# Patient Record
Sex: MALE | Race: WHITE | NOT HISPANIC OR LATINO
[De-identification: names, ages, dates, MRNs, and addresses within clinical notes are randomized per-mention and may not be internally consistent; named-entity substitution may affect disease eponyms.]

---

## 2017-07-25 ENCOUNTER — APPOINTMENT (OUTPATIENT)
Dept: UROLOGY | Facility: CLINIC | Age: 37
End: 2017-07-25

## 2017-07-27 ENCOUNTER — APPOINTMENT (OUTPATIENT)
Dept: UROLOGY | Facility: CLINIC | Age: 37
End: 2017-07-27

## 2019-06-12 ENCOUNTER — LABORATORY RESULT (OUTPATIENT)
Age: 39
End: 2019-06-12

## 2019-06-12 ENCOUNTER — APPOINTMENT (OUTPATIENT)
Dept: PULMONOLOGY | Facility: CLINIC | Age: 39
End: 2019-06-12
Payer: COMMERCIAL

## 2019-06-12 ENCOUNTER — NON-APPOINTMENT (OUTPATIENT)
Age: 39
End: 2019-06-12

## 2019-06-12 VITALS
OXYGEN SATURATION: 97 % | HEIGHT: 69 IN | DIASTOLIC BLOOD PRESSURE: 76 MMHG | TEMPERATURE: 98.6 F | WEIGHT: 172 LBS | HEART RATE: 60 BPM | RESPIRATION RATE: 14 BRPM | BODY MASS INDEX: 25.48 KG/M2 | SYSTOLIC BLOOD PRESSURE: 119 MMHG

## 2019-06-12 PROCEDURE — 36415 COLL VENOUS BLD VENIPUNCTURE: CPT

## 2019-06-12 PROCEDURE — 93000 ELECTROCARDIOGRAM COMPLETE: CPT

## 2019-06-12 PROCEDURE — 81003 URINALYSIS AUTO W/O SCOPE: CPT | Mod: QW

## 2019-06-12 PROCEDURE — 99396 PREV VISIT EST AGE 40-64: CPT | Mod: 25

## 2019-06-12 PROCEDURE — 83036 HEMOGLOBIN GLYCOSYLATED A1C: CPT | Mod: QW

## 2019-06-14 LAB
BILIRUB UR QL STRIP: NORMAL
CLARITY UR: CLEAR
COLLECTION METHOD: NORMAL
GLUCOSE UR-MCNC: NEGATIVE
HCG UR QL: 0.2 EU/DL
HGB UR QL STRIP.AUTO: NEGATIVE
KETONES UR-MCNC: NORMAL
LEUKOCYTE ESTERASE UR QL STRIP: NEGATIVE
NITRITE UR QL STRIP: NEGATIVE
PH UR STRIP: 5.5
PROT UR STRIP-MCNC: NEGATIVE
SP GR UR STRIP: 1.02

## 2019-06-14 NOTE — PHYSICAL EXAM
[No Acute Distress] : no acute distress [Well Nourished] : well nourished [Well Developed] : well developed [Well-Appearing] : well-appearing [Normal Sclera/Conjunctiva] : normal sclera/conjunctiva [EOMI] : extraocular movements intact [Normal Outer Ear/Nose] : the outer ears and nose were normal in appearance [PERRL] : pupils equal round and reactive to light [Normal Oropharynx] : the oropharynx was normal [No JVD] : no jugular venous distention [Supple] : supple [No Lymphadenopathy] : no lymphadenopathy [Thyroid Normal, No Nodules] : the thyroid was normal and there were no nodules present [Clear to Auscultation] : lungs were clear to auscultation bilaterally [No Respiratory Distress] : no respiratory distress  [No Accessory Muscle Use] : no accessory muscle use [Normal Rate] : normal rate  [Regular Rhythm] : with a regular rhythm [No Murmur] : no murmur heard [Normal S1, S2] : normal S1 and S2 [No Abdominal Bruit] : a ~M bruit was not heard ~T in the abdomen [No Carotid Bruits] : no carotid bruits [No Varicosities] : no varicosities [Pedal Pulses Present] : the pedal pulses are present [No Edema] : there was no peripheral edema [No Extremity Clubbing/Cyanosis] : no extremity clubbing/cyanosis [No Palpable Aorta] : no palpable aorta [Soft] : abdomen soft [Non Tender] : non-tender [Non-distended] : non-distended [No Masses] : no abdominal mass palpated [No HSM] : no HSM [Normal Bowel Sounds] : normal bowel sounds [Normal Posterior Cervical Nodes] : no posterior cervical lymphadenopathy [Normal Anterior Cervical Nodes] : no anterior cervical lymphadenopathy [No CVA Tenderness] : no CVA  tenderness [No Spinal Tenderness] : no spinal tenderness [No Joint Swelling] : no joint swelling [Grossly Normal Strength/Tone] : grossly normal strength/tone [Normal Gait] : normal gait [No Rash] : no rash [No Focal Deficits] : no focal deficits [Coordination Grossly Intact] : coordination grossly intact [Deep Tendon Reflexes (DTR)] : deep tendon reflexes were 2+ and symmetric [Normal Affect] : the affect was normal [Normal Insight/Judgement] : insight and judgment were intact

## 2019-06-14 NOTE — DATA REVIEWED
[FreeTextEntry1] : EKG performed in office today shows sinus bradycardia at 52 beats per minute, no acute ST changes.\par \par Urinalysis is negative

## 2019-06-18 LAB
25(OH)D3 SERPL-MCNC: 28.5 NG/ML
ALBUMIN SERPL ELPH-MCNC: 4.7 G/DL
ALBUMIN: 30
ALP BLD-CCNC: 67 U/L
ALT SERPL-CCNC: 19 U/L
ANION GAP SERPL CALC-SCNC: 14 MMOL/L
AST SERPL-CCNC: 17 U/L
BASOPHILS # BLD AUTO: 0.05 K/UL
BASOPHILS NFR BLD AUTO: 0.7 %
BILIRUB SERPL-MCNC: 1.1 MG/DL
BUN SERPL-MCNC: 15 MG/DL
CALCIUM SERPL-MCNC: 9.3 MG/DL
CHLORIDE SERPL-SCNC: 104 MMOL/L
CHOLEST SERPL-MCNC: 147 MG/DL
CHOLEST/HDLC SERPL: 3.1 RATIO
CO2 SERPL-SCNC: 24 MMOL/L
CREAT SERPL-MCNC: 0.78 MG/DL
CREATININE: <30
EOSINOPHIL # BLD AUTO: 0.1 K/UL
EOSINOPHIL NFR BLD AUTO: 1.3 %
GLUCOSE SERPL-MCNC: 91 MG/DL
HBA1C MFR BLD HPLC: 5
HCT VFR BLD CALC: 46.8 %
HCV AB SER QL: NONREACTIVE
HCV S/CO RATIO: 0.04 S/CO
HDLC SERPL-MCNC: 48 MG/DL
HGB BLD-MCNC: 15.2 G/DL
IMM GRANULOCYTES NFR BLD AUTO: 0.4 %
LDLC SERPL CALC-MCNC: 84 MG/DL
LYMPHOCYTES # BLD AUTO: 3.07 K/UL
LYMPHOCYTES NFR BLD AUTO: 40.4 %
MAGNESIUM SERPL-MCNC: 2.1 MG/DL
MAN DIFF?: NORMAL
MCHC RBC-ENTMCNC: 29.7 PG
MCHC RBC-ENTMCNC: 32.5 GM/DL
MCV RBC AUTO: 91.6 FL
MICROALBUMIN/CREAT UR TEST STR-RTO: 300
MONOCYTES # BLD AUTO: 0.4 K/UL
MONOCYTES NFR BLD AUTO: 5.3 %
NEUTROPHILS # BLD AUTO: 3.95 K/UL
NEUTROPHILS NFR BLD AUTO: 51.9 %
PHOSPHATE SERPL-MCNC: 4.4 MG/DL
PLATELET # BLD AUTO: 221 K/UL
POTASSIUM SERPL-SCNC: 4.1 MMOL/L
PROT SERPL-MCNC: 7 G/DL
PSA SERPL-MCNC: 0.71 NG/ML
RBC # BLD: 5.11 M/UL
RBC # FLD: 12.9 %
SODIUM SERPL-SCNC: 142 MMOL/L
T3 SERPL-MCNC: 105 NG/DL
T3RU NFR SERPL: 1 TBI
T4 FREE SERPL-MCNC: 1.3 NG/DL
T4 SERPL-MCNC: 5.4 UG/DL
TRIGL SERPL-MCNC: 75 MG/DL
TSH SERPL-ACNC: 2.36 UIU/ML
WBC # FLD AUTO: 7.6 K/UL

## 2019-08-28 ENCOUNTER — RX RENEWAL (OUTPATIENT)
Age: 39
End: 2019-08-28

## 2020-08-10 ENCOUNTER — LABORATORY RESULT (OUTPATIENT)
Age: 40
End: 2020-08-10

## 2020-08-10 ENCOUNTER — APPOINTMENT (OUTPATIENT)
Dept: PULMONOLOGY | Facility: CLINIC | Age: 40
End: 2020-08-10
Payer: COMMERCIAL

## 2020-08-10 ENCOUNTER — NON-APPOINTMENT (OUTPATIENT)
Age: 40
End: 2020-08-10

## 2020-08-10 VITALS
DIASTOLIC BLOOD PRESSURE: 78 MMHG | SYSTOLIC BLOOD PRESSURE: 113 MMHG | BODY MASS INDEX: 24.18 KG/M2 | HEART RATE: 62 BPM | HEIGHT: 69.5 IN | WEIGHT: 167.02 LBS | OXYGEN SATURATION: 96 % | TEMPERATURE: 97.6 F

## 2020-08-10 PROCEDURE — 99396 PREV VISIT EST AGE 40-64: CPT | Mod: 25

## 2020-08-10 PROCEDURE — 36415 COLL VENOUS BLD VENIPUNCTURE: CPT

## 2020-08-10 PROCEDURE — 82044 UR ALBUMIN SEMIQUANTITATIVE: CPT | Mod: QW

## 2020-08-10 PROCEDURE — 93000 ELECTROCARDIOGRAM COMPLETE: CPT

## 2020-08-10 PROCEDURE — 81003 URINALYSIS AUTO W/O SCOPE: CPT | Mod: QW

## 2020-08-10 NOTE — PHYSICAL EXAM
[No Acute Distress] : no acute distress [Well Nourished] : well nourished [Well Developed] : well developed [Well-Appearing] : well-appearing [Normal Sclera/Conjunctiva] : normal sclera/conjunctiva [PERRL] : pupils equal round and reactive to light [Normal Outer Ear/Nose] : the outer ears and nose were normal in appearance [EOMI] : extraocular movements intact [No Lymphadenopathy] : no lymphadenopathy [No JVD] : no jugular venous distention [Normal Oropharynx] : the oropharynx was normal [No Respiratory Distress] : no respiratory distress  [Thyroid Normal, No Nodules] : the thyroid was normal and there were no nodules present [Supple] : supple [No Accessory Muscle Use] : no accessory muscle use [Clear to Auscultation] : lungs were clear to auscultation bilaterally [Normal Rate] : normal rate  [Normal S1, S2] : normal S1 and S2 [Regular Rhythm] : with a regular rhythm [No Murmur] : no murmur heard [No Abdominal Bruit] : a ~M bruit was not heard ~T in the abdomen [No Varicosities] : no varicosities [No Carotid Bruits] : no carotid bruits [No Edema] : there was no peripheral edema [Pedal Pulses Present] : the pedal pulses are present [No Palpable Aorta] : no palpable aorta [Soft] : abdomen soft [No Extremity Clubbing/Cyanosis] : no extremity clubbing/cyanosis [No Masses] : no abdominal mass palpated [Non-distended] : non-distended [Non Tender] : non-tender [Normal Bowel Sounds] : normal bowel sounds [No HSM] : no HSM [Normal Anterior Cervical Nodes] : no anterior cervical lymphadenopathy [Normal Posterior Cervical Nodes] : no posterior cervical lymphadenopathy [No Spinal Tenderness] : no spinal tenderness [No Joint Swelling] : no joint swelling [No CVA Tenderness] : no CVA  tenderness [No Rash] : no rash [Grossly Normal Strength/Tone] : grossly normal strength/tone [No Focal Deficits] : no focal deficits [Coordination Grossly Intact] : coordination grossly intact [Normal Affect] : the affect was normal [Normal Gait] : normal gait [Normal Insight/Judgement] : insight and judgment were intact

## 2020-08-11 NOTE — DATA REVIEWED
[FreeTextEntry1] : EKG shows sinus bradycardia at 88 bpm, no acute ST changes.\par \par \par  POCT - A Urinalysis Dip (Automated)             Final\par \par No Documents Attached\par \par \par   Test   Result   Flag Reference Goal Last Verified \par   Glucose negative      REQUIRED \par   Bilirubin SMALL      REQUIRED \par   Ketone negative      REQUIRED \par   Specific Gravity >1.030      REQUIRED \par   Blood TRACE-INTACT      REQUIRED \par   pH 5.5      REQUIRED \par   Protein TRACE      REQUIRED \par   Urobilinogen 0.2 EU/dl      REQUIRED \par   Nitrite negative      REQUIRED \par   Leukocytes negative      REQUIRED \par   Clarity Clear      REQUIRED \par \par  Ordered by: KHALIF HERRERA       Collected/Examined: 10Aug2020 03:21PM       \par Verification Required       Stage: Final       \par  Performed at: In Office       Performed by: KHALIF HERRERA       Resulted: 10Aug2020 03:21PM       Last Updated: 10Aug2020 03:22PM       \par

## 2020-08-16 ENCOUNTER — TRANSCRIPTION ENCOUNTER (OUTPATIENT)
Age: 40
End: 2020-08-16

## 2020-08-16 LAB
25(OH)D3 SERPL-MCNC: 65.1 NG/ML
ALBUMIN SERPL ELPH-MCNC: 4.8 G/DL
ALBUMIN: 30
ALP BLD-CCNC: 65 U/L
ALT SERPL-CCNC: 15 U/L
ANION GAP SERPL CALC-SCNC: 13 MMOL/L
AST SERPL-CCNC: 16 U/L
BACTERIA UR CULT: NORMAL
BASOPHILS # BLD AUTO: 0.05 K/UL
BASOPHILS NFR BLD AUTO: 0.6 %
BILIRUB SERPL-MCNC: 1.6 MG/DL
BILIRUB UR QL STRIP: NORMAL
BUN SERPL-MCNC: 13 MG/DL
CALCIUM SERPL-MCNC: 9.5 MG/DL
CHLORIDE SERPL-SCNC: 102 MMOL/L
CHOLEST SERPL-MCNC: 162 MG/DL
CHOLEST/HDLC SERPL: 3.4 RATIO
CLARITY UR: CLEAR
CO2 SERPL-SCNC: 26 MMOL/L
CREAT SERPL-MCNC: 0.76 MG/DL
CREAT SPEC-SCNC: 393 MG/DL
CREATININE: 300
EOSINOPHIL # BLD AUTO: 0.09 K/UL
EOSINOPHIL NFR BLD AUTO: 1 %
ESTIMATED AVERAGE GLUCOSE: 103 MG/DL
GLUCOSE SERPL-MCNC: 79 MG/DL
GLUCOSE UR-MCNC: NEGATIVE
HBA1C MFR BLD HPLC: 5.2 %
HCG UR QL: 0.2 EU/DL
HCT VFR BLD CALC: 44.8 %
HCV AB SER QL: NONREACTIVE
HCV S/CO RATIO: 0.07 S/CO
HDLC SERPL-MCNC: 47 MG/DL
HGB BLD-MCNC: 14.5 G/DL
HGB UR QL STRIP.AUTO: NORMAL
IMM GRANULOCYTES NFR BLD AUTO: 0.2 %
KETONES UR-MCNC: NEGATIVE
LDLC SERPL CALC-MCNC: 98 MG/DL
LEUKOCYTE ESTERASE UR QL STRIP: NEGATIVE
LYMPHOCYTES # BLD AUTO: 3.23 K/UL
LYMPHOCYTES NFR BLD AUTO: 37.3 %
MAGNESIUM SERPL-MCNC: 2.2 MG/DL
MAN DIFF?: NORMAL
MCHC RBC-ENTMCNC: 29.2 PG
MCHC RBC-ENTMCNC: 32.4 GM/DL
MCV RBC AUTO: 90.1 FL
MICROALBUMIN 24H UR DL<=1MG/L-MCNC: 2.2 MG/DL
MICROALBUMIN/CREAT 24H UR-RTO: 6 MG/G
MICROALBUMIN/CREAT UR TEST STR-RTO: <30
MONOCYTES # BLD AUTO: 0.41 K/UL
MONOCYTES NFR BLD AUTO: 4.7 %
NEUTROPHILS # BLD AUTO: 4.86 K/UL
NEUTROPHILS NFR BLD AUTO: 56.2 %
NITRITE UR QL STRIP: NEGATIVE
PH UR STRIP: 5.5
PHOSPHATE SERPL-MCNC: 3.6 MG/DL
PLATELET # BLD AUTO: 237 K/UL
POTASSIUM SERPL-SCNC: 4.1 MMOL/L
PROT SERPL-MCNC: 7.1 G/DL
PROT UR STRIP-MCNC: NORMAL
PSA SERPL-MCNC: 0.97 NG/ML
RBC # BLD: 4.97 M/UL
RBC # FLD: 12.2 %
SODIUM SERPL-SCNC: 141 MMOL/L
SP GR UR STRIP: >1.03
T3 SERPL-MCNC: 99 NG/DL
T3RU NFR SERPL: 1 TBI
T4 FREE SERPL-MCNC: 1.2 NG/DL
T4 SERPL-MCNC: 5 UG/DL
TRIGL SERPL-MCNC: 82 MG/DL
TSH SERPL-ACNC: 1.68 UIU/ML
WBC # FLD AUTO: 8.66 K/UL

## 2021-09-08 ENCOUNTER — LABORATORY RESULT (OUTPATIENT)
Age: 41
End: 2021-09-08

## 2021-09-08 ENCOUNTER — NON-APPOINTMENT (OUTPATIENT)
Age: 41
End: 2021-09-08

## 2021-09-08 ENCOUNTER — APPOINTMENT (OUTPATIENT)
Dept: PULMONOLOGY | Facility: CLINIC | Age: 41
End: 2021-09-08
Payer: COMMERCIAL

## 2021-09-08 VITALS
SYSTOLIC BLOOD PRESSURE: 122 MMHG | TEMPERATURE: 97.5 F | BODY MASS INDEX: 23.55 KG/M2 | HEART RATE: 57 BPM | DIASTOLIC BLOOD PRESSURE: 86 MMHG | WEIGHT: 159 LBS | OXYGEN SATURATION: 98 % | HEIGHT: 69 IN

## 2021-09-08 DIAGNOSIS — R35.8 XXOTHER POLYURIA: ICD-10-CM

## 2021-09-08 PROCEDURE — 82044 UR ALBUMIN SEMIQUANTITATIVE: CPT | Mod: QW

## 2021-09-08 PROCEDURE — 81003 URINALYSIS AUTO W/O SCOPE: CPT | Mod: QW

## 2021-09-08 PROCEDURE — 36415 COLL VENOUS BLD VENIPUNCTURE: CPT

## 2021-09-08 PROCEDURE — 99396 PREV VISIT EST AGE 40-64: CPT | Mod: 25

## 2021-09-08 PROCEDURE — 83036 HEMOGLOBIN GLYCOSYLATED A1C: CPT | Mod: QW

## 2021-09-08 PROCEDURE — 93000 ELECTROCARDIOGRAM COMPLETE: CPT

## 2021-09-09 LAB
BILIRUB UR QL STRIP: NORMAL
CLARITY UR: CLEAR
COLLECTION METHOD: NORMAL
GLUCOSE UR-MCNC: NORMAL
HBA1C MFR BLD HPLC: 5
HCG UR QL: 0.2 EU/DL
HGB UR QL STRIP.AUTO: NORMAL
KETONES UR-MCNC: NORMAL
LEUKOCYTE ESTERASE UR QL STRIP: NORMAL
NITRITE UR QL STRIP: NORMAL
PH UR STRIP: 5.5
PROT UR STRIP-MCNC: NORMAL
SP GR UR STRIP: 1.02

## 2021-09-09 NOTE — DATA REVIEWED
[FreeTextEntry1] : EKG shows sinus rhythm at 57 bpm, no acute ST changes.\par \par \par  POCT - Hemoglobin A1C             Final\par \par No Documents Attached\par \par \par   Test   Result   Flag Reference Goal Last Verified \par   POCT Hemoglobin A1C 5.0   Normal: 4.0 - 5.6  REQUIRED \par \par  Ordered by: KHALIF HERRERA       Collected/Examined: 08Sep2021 06:41PM       \par Verification Required       Stage: Final       \par  Performed at: In Office       Performed by: KHALIF HERRERA       Resulted: 08Sep2021 06:41PM       Last Updated: 08Sep2021 06:42PM     \par \par  POCT - A Urinalysis Dip (Automated)             Final\par \par No Documents Attached\par \par \par   Test   Result   Flag Reference Goal Last Verified \par   Glucose NEG      REQUIRED \par   Bilirubin NEG      REQUIRED \par   Ketone NEG      REQUIRED \par   Specific Gravity 1.025      REQUIRED \par   Blood NEG      REQUIRED \par   pH 5.5      REQUIRED \par   Protein NEG      REQUIRED \par   Urobilinogen 0.2 EU/dl      REQUIRED \par   Nitrite NEG      REQUIRED \par   Leukocytes NEG      REQUIRED \par   Clarity Clear      REQUIRED \par   Collection Method Void      REQUIRED \par \par  Ordered by: KHALIF HERRERA       Collected/Examined: 08Sep2021 06:19PM       \par Verification Required       Stage: Final       \par  Performed at: In Office       Performed by: KHALIF HERRERA       Resulted: 08Sep2021 06:19PM       Last Updated: 08Sep2021 06:20PM       \par   \par

## 2021-09-12 LAB
25(OH)D3 SERPL-MCNC: 65 NG/ML
ALBUMIN SERPL ELPH-MCNC: 4.6 G/DL
ALBUMIN: 10
ALP BLD-CCNC: 54 U/L
ALT SERPL-CCNC: 13 U/L
ANION GAP SERPL CALC-SCNC: 14 MMOL/L
AST SERPL-CCNC: 15 U/L
BASOPHILS # BLD AUTO: 0.05 K/UL
BASOPHILS NFR BLD AUTO: 0.7 %
BILIRUB SERPL-MCNC: 1.5 MG/DL
BUN SERPL-MCNC: 12 MG/DL
CALCIUM SERPL-MCNC: 9.4 MG/DL
CHLORIDE SERPL-SCNC: 101 MMOL/L
CHOLEST SERPL-MCNC: 153 MG/DL
CO2 SERPL-SCNC: 24 MMOL/L
CREAT SERPL-MCNC: 0.8 MG/DL
CREATININE: 300
EOSINOPHIL # BLD AUTO: 0.09 K/UL
EOSINOPHIL NFR BLD AUTO: 1.2 %
GLUCOSE SERPL-MCNC: 74 MG/DL
HCT VFR BLD CALC: 45.6 %
HCV AB SER QL: NONREACTIVE
HCV S/CO RATIO: 0.11 S/CO
HDLC SERPL-MCNC: 54 MG/DL
HGB BLD-MCNC: 14.8 G/DL
IMM GRANULOCYTES NFR BLD AUTO: 0.3 %
LDLC SERPL CALC-MCNC: 89 MG/DL
LYMPHOCYTES # BLD AUTO: 3.05 K/UL
LYMPHOCYTES NFR BLD AUTO: 40.9 %
MAGNESIUM SERPL-MCNC: 2 MG/DL
MAN DIFF?: NORMAL
MCHC RBC-ENTMCNC: 30 PG
MCHC RBC-ENTMCNC: 32.5 GM/DL
MCV RBC AUTO: 92.3 FL
MICROALBUMIN/CREAT UR TEST STR-RTO: 30
MONOCYTES # BLD AUTO: 0.41 K/UL
MONOCYTES NFR BLD AUTO: 5.5 %
NEUTROPHILS # BLD AUTO: 3.84 K/UL
NEUTROPHILS NFR BLD AUTO: 51.4 %
NONHDLC SERPL-MCNC: 99 MG/DL
PHOSPHATE SERPL-MCNC: 4.1 MG/DL
PLATELET # BLD AUTO: 202 K/UL
POTASSIUM SERPL-SCNC: 3.9 MMOL/L
PROT SERPL-MCNC: 7.1 G/DL
PSA SERPL-MCNC: 0.59 NG/ML
RBC # BLD: 4.94 M/UL
RBC # FLD: 12.5 %
SODIUM SERPL-SCNC: 140 MMOL/L
T3 SERPL-MCNC: 101 NG/DL
T3RU NFR SERPL: 0.9 TBI
T4 FREE SERPL-MCNC: 1.5 NG/DL
T4 SERPL-MCNC: 5.6 UG/DL
TRIGL SERPL-MCNC: 49 MG/DL
TSH SERPL-ACNC: 2.8 UIU/ML
WBC # FLD AUTO: 7.46 K/UL

## 2021-12-01 ENCOUNTER — APPOINTMENT (OUTPATIENT)
Dept: PULMONOLOGY | Facility: CLINIC | Age: 41
End: 2021-12-01
Payer: COMMERCIAL

## 2021-12-01 VITALS — DIASTOLIC BLOOD PRESSURE: 82 MMHG | OXYGEN SATURATION: 99 % | HEART RATE: 62 BPM | SYSTOLIC BLOOD PRESSURE: 129 MMHG

## 2021-12-01 DIAGNOSIS — Z82.3 FAMILY HISTORY OF STROKE: ICD-10-CM

## 2021-12-01 DIAGNOSIS — Z83.3 FAMILY HISTORY OF DIABETES MELLITUS: ICD-10-CM

## 2021-12-01 PROCEDURE — 99214 OFFICE O/P EST MOD 30 MIN: CPT | Mod: 25

## 2021-12-01 PROCEDURE — 36415 COLL VENOUS BLD VENIPUNCTURE: CPT

## 2021-12-02 LAB
ANION GAP SERPL CALC-SCNC: 16 MMOL/L
BUN SERPL-MCNC: 11 MG/DL
CALCIUM SERPL-MCNC: 9.7 MG/DL
CHLORIDE SERPL-SCNC: 103 MMOL/L
CO2 SERPL-SCNC: 24 MMOL/L
CREAT SERPL-MCNC: 0.73 MG/DL
GLUCOSE SERPL-MCNC: 83 MG/DL
POTASSIUM SERPL-SCNC: 4.6 MMOL/L
SODIUM SERPL-SCNC: 143 MMOL/L

## 2021-12-02 NOTE — PLAN
[FreeTextEntry1] : Venipuncture with labs drawn in office\par Get abdominal/pelvic CT scans for further evaluation.\par Continue home blood pressure log book.

## 2021-12-02 NOTE — REVIEW OF SYSTEMS
[Negative] : Heme/Lymph [Abdominal Pain] : abdominal pain [FreeTextEntry7] : Intermittent left lower quadrant abdominal pain

## 2021-12-02 NOTE — ASSESSMENT
[FreeTextEntry1] : Intermittent left lower quadrant abdominal pain, rule out diverticulitis, rule out ulcerative colitis.

## 2021-12-02 NOTE — HISTORY OF PRESENT ILLNESS
[FreeTextEntry1] : BP f/u.  Also complains of intermittent left lower quadrant abdominal pain, no nausea, vomiting or diarrhea. [de-identified] : Patient has been monitoring BP at home \par \par Overall feeling well\par \par Occasional feels pinching/pulling sensation in the left lower abdomen over the past 6 months

## 2021-12-22 ENCOUNTER — APPOINTMENT (OUTPATIENT)
Dept: PULMONOLOGY | Facility: CLINIC | Age: 41
End: 2021-12-22

## 2022-01-03 ENCOUNTER — APPOINTMENT (OUTPATIENT)
Dept: PULMONOLOGY | Facility: CLINIC | Age: 42
End: 2022-01-03
Payer: COMMERCIAL

## 2022-01-03 DIAGNOSIS — E74.39 OTHER DISORDERS OF INTESTINAL CARBOHYDRATE ABSORPTION: ICD-10-CM

## 2022-01-03 PROCEDURE — 99215 OFFICE O/P EST HI 40 MIN: CPT | Mod: 95

## 2022-01-04 PROBLEM — E74.39 GLUCOSE INTOLERANCE: Status: ACTIVE | Noted: 2021-09-08

## 2022-01-04 NOTE — HISTORY OF PRESENT ILLNESS
[TextBox_4] : Angelito was diagnosed with COVID-19 infection on 12/18/21, he had sore throat/headache/fever then, his symptoms have gradually resolved.  He is currently ready to return to work.\par \par \par This visit was provided via telehealth using real-time 2-way audio visual technology.  The patient, ZENA BELTRÁN, was located at [facility], [address] [place] [address]\[place] [address] at the time of the visit.  \par The provider, Rosy Garcia, was located at the office [address] [facility], [address], [place], [place] at the time of the visit. \par The patient, Mr. ZENA BELTRÁN  and physician Rosy Garcia DO, participated in the telehealth encounter.\par \par Verbal consent was obtained by the  from patient.\par

## 2022-01-04 NOTE — ASSESSMENT
[FreeTextEntry1] : Advised him on well hydration and rest.\par Medications reviewed. Continue present medications.\par Will complete sick note for Angelito.\par \par Time spent in telehealth consultation and charting is 40 minutes.

## 2022-01-17 ENCOUNTER — OUTPATIENT (OUTPATIENT)
Dept: OUTPATIENT SERVICES | Facility: HOSPITAL | Age: 42
LOS: 1 days | End: 2022-01-17
Payer: COMMERCIAL

## 2022-01-17 ENCOUNTER — APPOINTMENT (OUTPATIENT)
Dept: CT IMAGING | Facility: IMAGING CENTER | Age: 42
End: 2022-01-17
Payer: COMMERCIAL

## 2022-01-17 DIAGNOSIS — R10.32 LEFT LOWER QUADRANT PAIN: ICD-10-CM

## 2022-01-17 PROCEDURE — 74178 CT ABD&PLV WO CNTR FLWD CNTR: CPT | Mod: 26

## 2022-01-17 PROCEDURE — 74178 CT ABD&PLV WO CNTR FLWD CNTR: CPT

## 2022-03-28 ENCOUNTER — APPOINTMENT (OUTPATIENT)
Dept: PULMONOLOGY | Facility: CLINIC | Age: 42
End: 2022-03-28
Payer: COMMERCIAL

## 2022-03-28 PROCEDURE — 99213 OFFICE O/P EST LOW 20 MIN: CPT | Mod: 95

## 2022-03-28 NOTE — PLAN
[FreeTextEntry1] : Will get liver MRI for further evaluation.\par \par \par Time spent in telehealth consultation and charting is 20 minutes.

## 2022-03-28 NOTE — HISTORY OF PRESENT ILLNESS
[FreeTextEntry1] : Angelito's left lower quadrant abdominal pain has completely resolved.  He currently offers no complaints. [de-identified] : This visit was provided via telehealth using real-time 2-way audio visual technology.  The patient, ZENA BELTRÁN, was located at home, [address] [place] [address]\[place] [address] at the time of the visit.  \par The provider, Rosy Garcia, was located at the office [address] [facility], [address], [place], [place] at the time of the visit. \par The patient, Mr. ZENA BELTRÁN  and physician Rsoy Garcia DO, participated in the telehealth encounter.\par \par Verbal consent was obtained by the  from patient.\par

## 2022-03-28 NOTE — DATA REVIEWED
[FreeTextEntry1] : \par   CT Abdomen and Pelvis w/wo IV Cont             Final\par \par No Documents Attached\par \par \par \par \par   EXAM:  CT ABDOMEN AND PELVIS WAW IC\par \par \par PROCEDURE DATE:  01/17/2022\par \par \par \par INTERPRETATION:  CLINICAL INFORMATION: Left lower quadrant pain\par \par ADDITIONAL CLINICAL INFORMATION: Other, Non-specified\par \par COMPARISON: None.\par \par CONTRAST/COMPLICATIONS:\par IV Contrast: Omnipaque 350  90 cc administered   10 cc discarded\par Oral Contrast: Omnipaque 300\par Complications: None reported at time of study completion\par \par PROCEDURE:\par CT of the Abdomen and Pelvis was performed.\par Sagittal and coronal reformats were performed.\par \par FINDINGS:\par LOWER CHEST: Within normal limits.\par \par LIVER: 18 mm indeterminate hypodensity left hepatic lobe, possible hemangioma. Suggest follow-up MRI of the liver. Additional subcentimeter hypodensity in the right hepatic lobe.\par BILE DUCTS: Normal caliber.\par GALLBLADDER: Within normal limits.\par SPLEEN: Fluid collection, 2.7 x 2.5 x 1 cm at medial aspect of the inferior spleen lateral to the left kidney and posterior to the pancreatic tail. No associated inflammatory change, possible lymphangioma or subcapsular seroma or cyst.\par Within normal limits.\par PANCREAS: Within normal limits.\par ADRENALS: Within normal limits.\par KIDNEYS/URETERS: Within normal limits.\par \par BLADDER: Within normal limits.\par REPRODUCTIVE ORGANS: Normal size prostate\par \par BOWEL: No bowel obstruction. Appendix normal. No inflammatory changes of the bowel.\par PERITONEUM: No ascites.\par VESSELS: Within normal limits.\par RETROPERITONEUM/LYMPH NODES: No lymphadenopathy. ABDOMINAL WALL: Within normal limits.\par BONES: Within normal limits.\par \par IMPRESSION:\par No acute intra-abdominal process.\par Indeterminant liver lesion, possible hemangioma, suggest further evaluation with MRI.\par Perisplenic cyst or collection, probably possible lymphangioma or subcapsular cyst or seroma.\par \par \par \par --- End of Report ---\par \par \par \par \par \par \par MARY FERMIN MD; Attending Radiologist\par This document has been electronically signed. Jan 18 2022  9:10AM\par \par  \par \par  Ordered by: KHALIF HERRERA       Collected/Examined: 17Jan2022 07:51PM       \par Verified by: KHALIF HERRERA 21Jan2022 05:03PM       \par  Result Communication: No patient communication needed at this time;\par Stage: Final       \par  Performed at: [facility] the [facility]       Resulted: 17Jan2022 04:09PM       Last Updated: 21Jan2022 05:03PM       Accession: [id number]

## 2022-05-09 ENCOUNTER — OUTPATIENT (OUTPATIENT)
Dept: OUTPATIENT SERVICES | Facility: HOSPITAL | Age: 42
LOS: 1 days | End: 2022-05-09
Payer: COMMERCIAL

## 2022-05-09 ENCOUNTER — APPOINTMENT (OUTPATIENT)
Dept: MRI IMAGING | Facility: IMAGING CENTER | Age: 42
End: 2022-05-09
Payer: COMMERCIAL

## 2022-05-09 DIAGNOSIS — K76.9 LIVER DISEASE, UNSPECIFIED: ICD-10-CM

## 2022-05-09 PROCEDURE — A9585: CPT

## 2022-05-09 PROCEDURE — 74183 MRI ABD W/O CNTR FLWD CNTR: CPT

## 2022-05-09 PROCEDURE — 74183 MRI ABD W/O CNTR FLWD CNTR: CPT | Mod: 26

## 2022-06-01 ENCOUNTER — APPOINTMENT (OUTPATIENT)
Dept: PULMONOLOGY | Facility: CLINIC | Age: 42
End: 2022-06-01
Payer: COMMERCIAL

## 2022-06-01 VITALS
TEMPERATURE: 98.5 F | DIASTOLIC BLOOD PRESSURE: 84 MMHG | HEART RATE: 57 BPM | SYSTOLIC BLOOD PRESSURE: 118 MMHG | WEIGHT: 157 LBS | BODY MASS INDEX: 23.25 KG/M2 | HEIGHT: 69 IN | OXYGEN SATURATION: 98 %

## 2022-06-01 DIAGNOSIS — K76.9 LIVER DISEASE, UNSPECIFIED: ICD-10-CM

## 2022-06-01 PROCEDURE — 99213 OFFICE O/P EST LOW 20 MIN: CPT

## 2022-06-04 PROBLEM — K76.9 LIVER LESION: Status: ACTIVE | Noted: 2022-03-28

## 2022-06-04 NOTE — DATA REVIEWED
[FreeTextEntry1] : \par  MR Abdomen w/wo IV Cont             Final\par \par No Documents Attached\par \par \par \par \par   EXAM: [id number] - MR ABDOMEN WAW IC  - ORDERED BY: KHALIF HERRERA\par \par \par PROCEDURE DATE:  05/09/2022\par \par \par \par INTERPRETATION:  CLINICAL INFORMATION: Liver lesion on abdominal CT.\par \par COMPARISON: CT abdomen and pelvis 1/17/2022\par \par CONTRAST/COMPLICATIONS:\par IV Contrast: Gadavist  7 cc administered   3 cc discarded\par Oral Contrast: NONE\par Complications: None reported at time of study completion\par \par PROCEDURE:\par MRI of the abdomen was performed.\par MRCP was performed.\par \par FINDINGS: Motion artifacts degrade some of the imaging.\par LOWER CHEST: Within normal limits.\par \par LIVER: Left lobe segment 2 subcapsular 2.4 x 2 cm T1 dark T2 bright lesion with characteristic enhancement compatible with a hemangioma. Two additional subcentimeter T2 bright lesions within segment 8 could also represent hemangiomas however are inconsistently seen\par BILE DUCTS: Normal caliber.\par GALLBLADDER: Decompressed. Filled with T1 bright sludge.\par SPLEEN: Unilocular approximately 3.5 x 2.6 x 3 cm T1 dark T2 bright lesion along the lower aspect of splenic hilus with T2 dark hemosiderin rim inferiorly and dependent T2 dark shading suggestive for a posttraumatic pseudocyst. Spleen measures 12 cm maximally.\par PANCREAS: Pancreatic tail abuts the splenic hilar cyst\par ADRENALS: Within normal limits.\par KIDNEYS/URETERS: Symmetric renal enhancement without hydronephrosis. Small left central sinus cysts and tiny left cortical cysts\par \par VISUALIZED PORTIONS:\par BOWEL: Obstructed bowel. 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 May 11 2022  5:27PM\par \par  \par \par  Ordered by: KHALIF HERRERA       Collected/Examined: [id number] 06:18PM       \par Verified by: KHALIF HERRERA 15May2022 04:00PM       \par  Result Communication: No patient communication needed at this time;\par Stage: Final       \par  Performed at: [facility] at the [facility]       Resulted: 11May2022 04:48PM       Last Updated: 15May2022 04:00PM       Accession: [id number]

## 2022-06-04 NOTE — PLAN
[FreeTextEntry1] : Discussed with Angelito at length regarding aforementioned abdominal MRI report, and all findings are secondary to benignity\par Return for medical follow-up in 3 months.

## 2022-06-04 NOTE — HISTORY OF PRESENT ILLNESS
[FreeTextEntry1] : Follow-up for left lower quadrant abdominal pain [de-identified] : Patient seen in office and denies pain on left lower quadrant of abdomen.\par Patient states he wants to discuss results of MRI of the abdomen.\par

## 2022-06-04 NOTE — PHYSICAL EXAM
[No Acute Distress] : no acute distress [Well Nourished] : well nourished [Well Developed] : well developed [Well-Appearing] : well-appearing [Normal Sclera/Conjunctiva] : normal sclera/conjunctiva [PERRL] : pupils equal round and reactive to light [EOMI] : extraocular movements intact [Normal Outer Ear/Nose] : the outer ears and nose were normal in appearance [Normal Oropharynx] : the oropharynx was normal [No JVD] : no jugular venous distention [No Lymphadenopathy] : no lymphadenopathy [Supple] : supple [Thyroid Normal, No Nodules] : the thyroid was normal and there were no nodules present [No Respiratory Distress] : no respiratory distress  [No Accessory Muscle Use] : no accessory muscle use [Clear to Auscultation] : lungs were clear to auscultation bilaterally [Normal Rate] : normal rate  [Regular Rhythm] : with a regular rhythm [Normal S1, S2] : normal S1 and S2 [No Murmur] : no murmur heard [No Carotid Bruits] : no carotid bruits [No Abdominal Bruit] : a ~M bruit was not heard ~T in the abdomen [No Varicosities] : no varicosities [Pedal Pulses Present] : the pedal pulses are present [No Edema] : there was no peripheral edema [No Extremity Clubbing/Cyanosis] : no extremity clubbing/cyanosis [No Palpable Aorta] : no palpable aorta [Soft] : abdomen soft [Non Tender] : non-tender [Non-distended] : non-distended [No Masses] : no abdominal mass palpated [No HSM] : no HSM [Normal Bowel Sounds] : normal bowel sounds [Normal Posterior Cervical Nodes] : no posterior cervical lymphadenopathy [Normal Anterior Cervical Nodes] : no anterior cervical lymphadenopathy [No CVA Tenderness] : no CVA  tenderness [No Spinal Tenderness] : no spinal tenderness [No Joint Swelling] : no joint swelling [Grossly Normal Strength/Tone] : grossly normal strength/tone [No Rash] : no rash [Coordination Grossly Intact] : coordination grossly intact [No Focal Deficits] : no focal deficits [Normal Gait] : normal gait [Deep Tendon Reflexes (DTR)] : deep tendon reflexes were 2+ and symmetric [Normal Affect] : the affect was normal [Normal Insight/Judgement] : insight and judgment were intact [de-identified] : Appears comfortable

## 2022-09-13 ENCOUNTER — RESULT CHARGE (OUTPATIENT)
Age: 42
End: 2022-09-13

## 2022-09-14 ENCOUNTER — LABORATORY RESULT (OUTPATIENT)
Age: 42
End: 2022-09-14

## 2022-09-14 ENCOUNTER — APPOINTMENT (OUTPATIENT)
Dept: PULMONOLOGY | Facility: CLINIC | Age: 42
End: 2022-09-14

## 2022-09-14 ENCOUNTER — NON-APPOINTMENT (OUTPATIENT)
Age: 42
End: 2022-09-14

## 2022-09-14 VITALS
OXYGEN SATURATION: 97 % | DIASTOLIC BLOOD PRESSURE: 78 MMHG | HEART RATE: 64 BPM | TEMPERATURE: 97.5 F | SYSTOLIC BLOOD PRESSURE: 118 MMHG

## 2022-09-14 DIAGNOSIS — U07.1 COVID-19: ICD-10-CM

## 2022-09-14 LAB
ALBUMIN: NORMAL
BILIRUB UR QL STRIP: NORMAL
CLARITY UR: CLEAR
COLLECTION METHOD: NORMAL
CREATININE: NORMAL
GLUCOSE UR-MCNC: NORMAL
HCG UR QL: 0.2 EU/DL
HGB UR QL STRIP.AUTO: NORMAL
KETONES UR-MCNC: NORMAL
LEUKOCYTE ESTERASE UR QL STRIP: NORMAL
MICROALBUMIN/CREAT UR TEST STR-RTO: NORMAL
NITRITE UR QL STRIP: NORMAL
PH UR STRIP: 5.5
PROT UR STRIP-MCNC: NORMAL
SP GR UR STRIP: >+1.03

## 2022-09-14 PROCEDURE — 81003 URINALYSIS AUTO W/O SCOPE: CPT | Mod: QW

## 2022-09-14 PROCEDURE — 36415 COLL VENOUS BLD VENIPUNCTURE: CPT

## 2022-09-14 PROCEDURE — 82044 UR ALBUMIN SEMIQUANTITATIVE: CPT | Mod: QW

## 2022-09-14 PROCEDURE — 93000 ELECTROCARDIOGRAM COMPLETE: CPT

## 2022-09-14 PROCEDURE — 99396 PREV VISIT EST AGE 40-64: CPT | Mod: 25

## 2022-09-14 RX ORDER — CHROMIUM 200 MCG
TABLET ORAL
Refills: 0 | Status: ACTIVE | COMMUNITY

## 2022-09-14 RX ORDER — ERGOCALCIFEROL 1.25 MG/1
1.25 MG CAPSULE, LIQUID FILLED ORAL
Qty: 12 | Refills: 3 | Status: COMPLETED | COMMUNITY
Start: 2019-06-18 | End: 2022-09-14

## 2022-09-15 LAB
25(OH)D3 SERPL-MCNC: 69.1 NG/ML
ALBUMIN SERPL ELPH-MCNC: 4.9 G/DL
ALP BLD-CCNC: 63 U/L
ALT SERPL-CCNC: 19 U/L
ANION GAP SERPL CALC-SCNC: 12 MMOL/L
AST SERPL-CCNC: 16 U/L
BASOPHILS # BLD AUTO: 0.05 K/UL
BASOPHILS NFR BLD AUTO: 0.5 %
BILIRUB DIRECT SERPL-MCNC: 0.4 MG/DL
BILIRUB INDIRECT SERPL-MCNC: 1.6 MG/DL
BILIRUB SERPL-MCNC: 2 MG/DL
BUN SERPL-MCNC: 16 MG/DL
CALCIUM SERPL-MCNC: 9.6 MG/DL
CHLORIDE SERPL-SCNC: 102 MMOL/L
CHOLEST SERPL-MCNC: 147 MG/DL
CO2 SERPL-SCNC: 26 MMOL/L
CREAT SERPL-MCNC: 0.84 MG/DL
EGFR: 111 ML/MIN/1.73M2
EOSINOPHIL # BLD AUTO: 0.06 K/UL
EOSINOPHIL NFR BLD AUTO: 0.6 %
ESTIMATED AVERAGE GLUCOSE: 108 MG/DL
GLUCOSE SERPL-MCNC: 76 MG/DL
HBA1C MFR BLD HPLC: 5.4 %
HCT VFR BLD CALC: 44.1 %
HDLC SERPL-MCNC: 55 MG/DL
HGB BLD-MCNC: 14.5 G/DL
IMM GRANULOCYTES NFR BLD AUTO: 0.3 %
LDLC SERPL CALC-MCNC: 83 MG/DL
LYMPHOCYTES # BLD AUTO: 3.04 K/UL
LYMPHOCYTES NFR BLD AUTO: 31.2 %
MAN DIFF?: NORMAL
MCHC RBC-ENTMCNC: 29.3 PG
MCHC RBC-ENTMCNC: 32.9 GM/DL
MCV RBC AUTO: 89.1 FL
MONOCYTES # BLD AUTO: 0.49 K/UL
MONOCYTES NFR BLD AUTO: 5 %
NEUTROPHILS # BLD AUTO: 6.06 K/UL
NEUTROPHILS NFR BLD AUTO: 62.4 %
NONHDLC SERPL-MCNC: 91 MG/DL
PLATELET # BLD AUTO: 218 K/UL
POTASSIUM SERPL-SCNC: 4.6 MMOL/L
PROT SERPL-MCNC: 6.6 G/DL
PSA SERPL-MCNC: 0.95 NG/ML
RBC # BLD: 4.95 M/UL
RBC # FLD: 12.7 %
SODIUM SERPL-SCNC: 140 MMOL/L
T3 SERPL-MCNC: 102 NG/DL
T3RU NFR SERPL: 0.9 TBI
T4 FREE SERPL-MCNC: 1.4 NG/DL
T4 SERPL-MCNC: 6.2 UG/DL
TRIGL SERPL-MCNC: 43 MG/DL
TSH SERPL-ACNC: 2.36 UIU/ML
WBC # FLD AUTO: 9.73 K/UL

## 2022-09-15 NOTE — HISTORY OF PRESENT ILLNESS
[FreeTextEntry1] : Angelito is a pleasant 42-year-old gentleman without significant past medical history, he came in for annual physical examination today, offers no complaints.

## 2022-10-19 ENCOUNTER — MED ADMIN CHARGE (OUTPATIENT)
Age: 42
End: 2022-10-19

## 2022-10-19 ENCOUNTER — APPOINTMENT (OUTPATIENT)
Dept: PULMONOLOGY | Facility: CLINIC | Age: 42
End: 2022-10-19

## 2022-10-19 VITALS — HEART RATE: 61 BPM | SYSTOLIC BLOOD PRESSURE: 122 MMHG | OXYGEN SATURATION: 98 % | DIASTOLIC BLOOD PRESSURE: 79 MMHG

## 2022-10-19 DIAGNOSIS — Z23 ENCOUNTER FOR IMMUNIZATION: ICD-10-CM

## 2022-10-19 PROCEDURE — 99214 OFFICE O/P EST MOD 30 MIN: CPT | Mod: 25

## 2022-10-19 PROCEDURE — G0008: CPT

## 2022-10-19 PROCEDURE — 90686 IIV4 VACC NO PRSV 0.5 ML IM: CPT

## 2022-10-19 PROCEDURE — 36415 COLL VENOUS BLD VENIPUNCTURE: CPT

## 2022-10-19 NOTE — PHYSICAL EXAM
[No Acute Distress] : no acute distress [Well Nourished] : well nourished [Well Developed] : well developed [Well-Appearing] : well-appearing [Normal Sclera/Conjunctiva] : normal sclera/conjunctiva [PERRL] : pupils equal round and reactive to light [EOMI] : extraocular movements intact [Normal Outer Ear/Nose] : the outer ears and nose were normal in appearance [Normal Oropharynx] : the oropharynx was normal [No JVD] : no jugular venous distention [No Lymphadenopathy] : no lymphadenopathy [Supple] : supple [Thyroid Normal, No Nodules] : the thyroid was normal and there were no nodules present [No Respiratory Distress] : no respiratory distress  [No Accessory Muscle Use] : no accessory muscle use [Clear to Auscultation] : lungs were clear to auscultation bilaterally [Normal Rate] : normal rate  [Regular Rhythm] : with a regular rhythm [No Murmur] : no murmur heard [Normal S1, S2] : normal S1 and S2 [No Carotid Bruits] : no carotid bruits [No Abdominal Bruit] : a ~M bruit was not heard ~T in the abdomen [No Varicosities] : no varicosities [Pedal Pulses Present] : the pedal pulses are present [No Edema] : there was no peripheral edema [No Palpable Aorta] : no palpable aorta [No Extremity Clubbing/Cyanosis] : no extremity clubbing/cyanosis [Soft] : abdomen soft [Non Tender] : non-tender [Non-distended] : non-distended [No Masses] : no abdominal mass palpated [No HSM] : no HSM [Normal Bowel Sounds] : normal bowel sounds [Normal Posterior Cervical Nodes] : no posterior cervical lymphadenopathy [Normal Anterior Cervical Nodes] : no anterior cervical lymphadenopathy [No CVA Tenderness] : no CVA  tenderness [No Spinal Tenderness] : no spinal tenderness [No Joint Swelling] : no joint swelling [Grossly Normal Strength/Tone] : grossly normal strength/tone [No Rash] : no rash [No Focal Deficits] : no focal deficits [Coordination Grossly Intact] : coordination grossly intact [Normal Gait] : normal gait [Deep Tendon Reflexes (DTR)] : deep tendon reflexes were 2+ and symmetric [Normal Affect] : the affect was normal [Normal Insight/Judgement] : insight and judgment were intact

## 2022-10-22 NOTE — PLAN
[FreeTextEntry1] : Venipuncture with labs drawn in office\par Patient refused abdominal CT scan for further evaluation.\par Fluzone vaccine given today.

## 2022-10-22 NOTE — HISTORY OF PRESENT ILLNESS
[FreeTextEntry1] : Transient abdominal pain [de-identified] : Has had "pinching" sensation in left lower quadrant, 1-3 days, for about 2-3 weeks, starting in September; has not felt it in the past 2 weeks, completely asymptomatic in the last 2 weeks.\par \par Requesting flu shot

## 2022-10-23 LAB
ALBUMIN SERPL ELPH-MCNC: 4.5 G/DL
ALP BLD-CCNC: 66 U/L
ALT SERPL-CCNC: 18 U/L
ANION GAP SERPL CALC-SCNC: 15 MMOL/L
AST SERPL-CCNC: 24 U/L
BASOPHILS # BLD AUTO: 0.06 K/UL
BASOPHILS NFR BLD AUTO: 0.9 %
BILIRUB SERPL-MCNC: 1.5 MG/DL
BUN SERPL-MCNC: 18 MG/DL
CALCIUM SERPL-MCNC: 9.2 MG/DL
CHLORIDE SERPL-SCNC: 100 MMOL/L
CO2 SERPL-SCNC: 22 MMOL/L
CREAT SERPL-MCNC: 0.75 MG/DL
EGFR: 115 ML/MIN/1.73M2
EOSINOPHIL # BLD AUTO: 0.13 K/UL
EOSINOPHIL NFR BLD AUTO: 1.9 %
ERYTHROCYTE [SEDIMENTATION RATE] IN BLOOD BY WESTERGREN METHOD: 5 MM/HR
GLUCOSE SERPL-MCNC: 78 MG/DL
HCT VFR BLD CALC: 44.8 %
HGB BLD-MCNC: 15.2 G/DL
IMM GRANULOCYTES NFR BLD AUTO: 0.1 %
LYMPHOCYTES # BLD AUTO: 2.87 K/UL
LYMPHOCYTES NFR BLD AUTO: 42.7 %
MAN DIFF?: NORMAL
MCHC RBC-ENTMCNC: 30.3 PG
MCHC RBC-ENTMCNC: 33.9 GM/DL
MCV RBC AUTO: 89.2 FL
MONOCYTES # BLD AUTO: 0.39 K/UL
MONOCYTES NFR BLD AUTO: 5.8 %
NEUTROPHILS # BLD AUTO: 3.26 K/UL
NEUTROPHILS NFR BLD AUTO: 48.6 %
PLATELET # BLD AUTO: 233 K/UL
POTASSIUM SERPL-SCNC: 4.4 MMOL/L
PROT SERPL-MCNC: 6.9 G/DL
RBC # BLD: 5.02 M/UL
RBC # FLD: 12.5 %
SODIUM SERPL-SCNC: 137 MMOL/L
WBC # FLD AUTO: 6.72 K/UL

## 2022-10-24 ENCOUNTER — TRANSCRIPTION ENCOUNTER (OUTPATIENT)
Age: 42
End: 2022-10-24

## 2023-02-08 ENCOUNTER — APPOINTMENT (OUTPATIENT)
Dept: PULMONOLOGY | Facility: CLINIC | Age: 43
End: 2023-02-08
Payer: COMMERCIAL

## 2023-02-08 VITALS — SYSTOLIC BLOOD PRESSURE: 109 MMHG | OXYGEN SATURATION: 100 % | DIASTOLIC BLOOD PRESSURE: 68 MMHG | HEART RATE: 65 BPM

## 2023-02-08 DIAGNOSIS — D73.4 CYST OF SPLEEN: ICD-10-CM

## 2023-02-08 DIAGNOSIS — R10.32 LEFT LOWER QUADRANT PAIN: ICD-10-CM

## 2023-02-08 PROCEDURE — 99213 OFFICE O/P EST LOW 20 MIN: CPT | Mod: 25

## 2023-02-08 PROCEDURE — 36415 COLL VENOUS BLD VENIPUNCTURE: CPT

## 2023-02-08 NOTE — HISTORY OF PRESENT ILLNESS
[FreeTextEntry1] : F/u  [de-identified] : Overall feeling well; has not felt "pinching" pain in abdomen as often, however, has felt some pain in the past few weeks; may be stress related

## 2023-02-08 NOTE — PLAN
[FreeTextEntry1] : Venipuncture with labs drawn in office\par Return for annual physical examination in 7 months.

## 2023-02-12 LAB
ALBUMIN SERPL ELPH-MCNC: 4.5 G/DL
ALP BLD-CCNC: 59 U/L
ALT SERPL-CCNC: 19 U/L
ANION GAP SERPL CALC-SCNC: 13 MMOL/L
AST SERPL-CCNC: 18 U/L
BASOPHILS # BLD AUTO: 0.04 K/UL
BASOPHILS NFR BLD AUTO: 0.5 %
BILIRUB SERPL-MCNC: 1.4 MG/DL
BUN SERPL-MCNC: 16 MG/DL
CALCIUM SERPL-MCNC: 9.7 MG/DL
CHLORIDE SERPL-SCNC: 104 MMOL/L
CO2 SERPL-SCNC: 24 MMOL/L
CREAT SERPL-MCNC: 0.76 MG/DL
EGFR: 114 ML/MIN/1.73M2
EOSINOPHIL # BLD AUTO: 0.05 K/UL
EOSINOPHIL NFR BLD AUTO: 0.6 %
GLUCOSE SERPL-MCNC: 77 MG/DL
HCT VFR BLD CALC: 42.9 %
HGB BLD-MCNC: 14.3 G/DL
IMM GRANULOCYTES NFR BLD AUTO: 0.1 %
LYMPHOCYTES # BLD AUTO: 3.06 K/UL
LYMPHOCYTES NFR BLD AUTO: 38.4 %
MAN DIFF?: NORMAL
MCHC RBC-ENTMCNC: 29.5 PG
MCHC RBC-ENTMCNC: 33.3 GM/DL
MCV RBC AUTO: 88.6 FL
MONOCYTES # BLD AUTO: 0.37 K/UL
MONOCYTES NFR BLD AUTO: 4.6 %
NEUTROPHILS # BLD AUTO: 4.43 K/UL
NEUTROPHILS NFR BLD AUTO: 55.8 %
PLATELET # BLD AUTO: 216 K/UL
POTASSIUM SERPL-SCNC: 3.7 MMOL/L
PROT SERPL-MCNC: 6.9 G/DL
RBC # BLD: 4.84 M/UL
RBC # FLD: 13.4 %
SODIUM SERPL-SCNC: 140 MMOL/L
WBC # FLD AUTO: 7.96 K/UL

## 2023-10-24 ENCOUNTER — RESULT CHARGE (OUTPATIENT)
Age: 43
End: 2023-10-24

## 2023-10-25 ENCOUNTER — APPOINTMENT (OUTPATIENT)
Dept: PULMONOLOGY | Facility: CLINIC | Age: 43
End: 2023-10-25
Payer: COMMERCIAL

## 2023-10-25 ENCOUNTER — LABORATORY RESULT (OUTPATIENT)
Age: 43
End: 2023-10-25

## 2023-10-25 ENCOUNTER — NON-APPOINTMENT (OUTPATIENT)
Age: 43
End: 2023-10-25

## 2023-10-25 VITALS
HEIGHT: 69 IN | SYSTOLIC BLOOD PRESSURE: 125 MMHG | HEART RATE: 55 BPM | RESPIRATION RATE: 14 BRPM | OXYGEN SATURATION: 99 % | BODY MASS INDEX: 23.11 KG/M2 | WEIGHT: 156 LBS | DIASTOLIC BLOOD PRESSURE: 81 MMHG

## 2023-10-25 DIAGNOSIS — Z00.00 ENCOUNTER FOR GENERAL ADULT MEDICAL EXAMINATION W/OUT ABNORMAL FINDINGS: ICD-10-CM

## 2023-10-25 DIAGNOSIS — R07.89 OTHER CHEST PAIN: ICD-10-CM

## 2023-10-25 DIAGNOSIS — R31.29 OTHER MICROSCOPIC HEMATURIA: ICD-10-CM

## 2023-10-25 LAB
ALBUMIN: 30
BILIRUB UR QL STRIP: NEGATIVE
CLARITY UR: CLEAR
COLLECTION METHOD: NORMAL
CREATININE: 200
GLUCOSE UR-MCNC: NEGATIVE
HCG UR QL: 0.2 EU/DL
HGB UR QL STRIP.AUTO: NORMAL
KETONES UR-MCNC: 15
LEUKOCYTE ESTERASE UR QL STRIP: NEGATIVE
MICROALBUMIN/CREAT UR TEST STR-RTO: 30
NITRITE UR QL STRIP: NEGATIVE
PH UR STRIP: 5.5
PROT UR STRIP-MCNC: NEGATIVE
SP GR UR STRIP: 1.02

## 2023-10-25 PROCEDURE — 81003 URINALYSIS AUTO W/O SCOPE: CPT | Mod: QW

## 2023-10-25 PROCEDURE — 93000 ELECTROCARDIOGRAM COMPLETE: CPT

## 2023-10-25 PROCEDURE — 99396 PREV VISIT EST AGE 40-64: CPT | Mod: 25

## 2023-10-25 PROCEDURE — 82044 UR ALBUMIN SEMIQUANTITATIVE: CPT | Mod: QW

## 2023-10-25 PROCEDURE — 36415 COLL VENOUS BLD VENIPUNCTURE: CPT

## 2023-10-26 ENCOUNTER — TRANSCRIPTION ENCOUNTER (OUTPATIENT)
Age: 43
End: 2023-10-26

## 2023-10-26 PROBLEM — R07.89 ATYPICAL CHEST PAIN: Status: ACTIVE | Noted: 2021-09-08

## 2023-10-26 LAB
25(OH)D3 SERPL-MCNC: 42.2 NG/ML
ALBUMIN SERPL ELPH-MCNC: 4.5 G/DL
ALP BLD-CCNC: 58 U/L
ALT SERPL-CCNC: 15 U/L
ANION GAP SERPL CALC-SCNC: 12 MMOL/L
AST SERPL-CCNC: 14 U/L
BASOPHILS # BLD AUTO: 0.07 K/UL
BASOPHILS NFR BLD AUTO: 1 %
BILIRUB DIRECT SERPL-MCNC: 0.4 MG/DL
BILIRUB INDIRECT SERPL-MCNC: 1.7 MG/DL
BILIRUB SERPL-MCNC: 2.1 MG/DL
BUN SERPL-MCNC: 13 MG/DL
CALCIUM SERPL-MCNC: 9.2 MG/DL
CHLORIDE SERPL-SCNC: 101 MMOL/L
CHOLEST SERPL-MCNC: 165 MG/DL
CO2 SERPL-SCNC: 25 MMOL/L
CREAT SERPL-MCNC: 0.82 MG/DL
EGFR: 111 ML/MIN/1.73M2
EOSINOPHIL # BLD AUTO: 0.08 K/UL
EOSINOPHIL NFR BLD AUTO: 1.1 %
ESTIMATED AVERAGE GLUCOSE: 103 MG/DL
GLUCOSE SERPL-MCNC: 81 MG/DL
HBA1C MFR BLD HPLC: 5.2 %
HCT VFR BLD CALC: 43.3 %
HDLC SERPL-MCNC: 60 MG/DL
HGB BLD-MCNC: 14.2 G/DL
IMM GRANULOCYTES NFR BLD AUTO: 0.1 %
LDLC SERPL CALC-MCNC: 94 MG/DL
LYMPHOCYTES # BLD AUTO: 3.19 K/UL
LYMPHOCYTES NFR BLD AUTO: 45.4 %
MAN DIFF?: NORMAL
MCHC RBC-ENTMCNC: 29.5 PG
MCHC RBC-ENTMCNC: 32.8 GM/DL
MCV RBC AUTO: 89.8 FL
MONOCYTES # BLD AUTO: 0.37 K/UL
MONOCYTES NFR BLD AUTO: 5.3 %
NEUTROPHILS # BLD AUTO: 3.31 K/UL
NEUTROPHILS NFR BLD AUTO: 47.1 %
NONHDLC SERPL-MCNC: 104 MG/DL
PLATELET # BLD AUTO: 251 K/UL
POTASSIUM SERPL-SCNC: 3.6 MMOL/L
PROT SERPL-MCNC: 6.8 G/DL
PSA SERPL-MCNC: 0.53 NG/ML
RBC # BLD: 4.82 M/UL
RBC # FLD: 12.8 %
SODIUM SERPL-SCNC: 137 MMOL/L
T3 SERPL-MCNC: 105 NG/DL
T3RU NFR SERPL: 1 TBI
T4 FREE SERPL-MCNC: 1.3 NG/DL
T4 SERPL-MCNC: 5.3 UG/DL
TRIGL SERPL-MCNC: 48 MG/DL
TSH SERPL-ACNC: 1.98 UIU/ML
WBC # FLD AUTO: 7.03 K/UL

## 2023-10-29 LAB
APPEARANCE: CLEAR
BACTERIA UR CULT: NORMAL
BACTERIA: NEGATIVE /HPF
BILIRUBIN URINE: NEGATIVE
BLOOD URINE: NEGATIVE
CAST: 0 /LPF
COLOR: YELLOW
CREAT SPEC-SCNC: 202 MG/DL
EPITHELIAL CELLS: 0 /HPF
GLUCOSE QUALITATIVE U: NEGATIVE MG/DL
KETONES URINE: ABNORMAL MG/DL
LEUKOCYTE ESTERASE URINE: NEGATIVE
MICROALBUMIN 24H UR DL<=1MG/L-MCNC: <1.2 MG/DL
MICROALBUMIN/CREAT 24H UR-RTO: NORMAL MG/G
MICROSCOPIC-UA: NORMAL
NITRITE URINE: NEGATIVE
PH URINE: 5.5
PROTEIN URINE: NEGATIVE MG/DL
RED BLOOD CELLS URINE: 2 /HPF
SPECIFIC GRAVITY URINE: 1.02
UROBILINOGEN URINE: 0.2 MG/DL
WHITE BLOOD CELLS URINE: 0 /HPF

## 2024-12-04 ENCOUNTER — APPOINTMENT (OUTPATIENT)
Dept: PULMONOLOGY | Facility: CLINIC | Age: 44
End: 2024-12-04
Payer: COMMERCIAL

## 2024-12-04 ENCOUNTER — LABORATORY RESULT (OUTPATIENT)
Age: 44
End: 2024-12-04

## 2024-12-04 VITALS
WEIGHT: 155 LBS | DIASTOLIC BLOOD PRESSURE: 83 MMHG | SYSTOLIC BLOOD PRESSURE: 130 MMHG | BODY MASS INDEX: 22.89 KG/M2 | OXYGEN SATURATION: 99 % | HEART RATE: 52 BPM

## 2024-12-04 DIAGNOSIS — E74.39 OTHER DISORDERS OF INTESTINAL CARBOHYDRATE ABSORPTION: ICD-10-CM

## 2024-12-04 DIAGNOSIS — R31.29 OTHER MICROSCOPIC HEMATURIA: ICD-10-CM

## 2024-12-04 DIAGNOSIS — R07.89 OTHER CHEST PAIN: ICD-10-CM

## 2024-12-04 DIAGNOSIS — Z00.00 ENCOUNTER FOR GENERAL ADULT MEDICAL EXAMINATION W/OUT ABNORMAL FINDINGS: ICD-10-CM

## 2024-12-04 PROCEDURE — 82044 UR ALBUMIN SEMIQUANTITATIVE: CPT | Mod: QW

## 2024-12-04 PROCEDURE — 93000 ELECTROCARDIOGRAM COMPLETE: CPT

## 2024-12-04 PROCEDURE — 36415 COLL VENOUS BLD VENIPUNCTURE: CPT

## 2024-12-04 PROCEDURE — 99396 PREV VISIT EST AGE 40-64: CPT

## 2024-12-04 PROCEDURE — 81003 URINALYSIS AUTO W/O SCOPE: CPT | Mod: QW

## 2024-12-06 ENCOUNTER — NON-APPOINTMENT (OUTPATIENT)
Age: 44
End: 2024-12-06

## 2024-12-08 LAB
25(OH)D3 SERPL-MCNC: 40.4 NG/ML
ALBUMIN SERPL ELPH-MCNC: 4.7 G/DL
ALBUMIN: 30
ALP BLD-CCNC: 65 U/L
ALT SERPL-CCNC: 17 U/L
ANION GAP SERPL CALC-SCNC: 13 MMOL/L
APPEARANCE: CLEAR
AST SERPL-CCNC: 17 U/L
BACTERIA UR CULT: NORMAL
BACTERIA: NEGATIVE /HPF
BASOPHILS # BLD AUTO: 0.04 K/UL
BASOPHILS NFR BLD AUTO: 0.6 %
BILIRUB SERPL-MCNC: 1.3 MG/DL
BILIRUB UR QL STRIP: NEGATIVE
BILIRUBIN URINE: NEGATIVE
BLOOD URINE: ABNORMAL
BUN SERPL-MCNC: 14 MG/DL
CALCIUM SERPL-MCNC: 9.1 MG/DL
CAST: 0 /LPF
CHLORIDE SERPL-SCNC: 101 MMOL/L
CHOLEST SERPL-MCNC: 176 MG/DL
CLARITY UR: CLEAR
CO2 SERPL-SCNC: 24 MMOL/L
COLLECTION METHOD: NORMAL
COLOR: YELLOW
CREAT SERPL-MCNC: 0.83 MG/DL
CREAT SPEC-SCNC: 142 MG/DL
CREATININE: 300
EGFR: 110 ML/MIN/1.73M2
EOSINOPHIL # BLD AUTO: 0.04 K/UL
EOSINOPHIL NFR BLD AUTO: 0.6 %
EPITHELIAL CELLS: 0 /HPF
ESTIMATED AVERAGE GLUCOSE: 108 MG/DL
GLUCOSE QUALITATIVE U: NEGATIVE MG/DL
GLUCOSE SERPL-MCNC: 77 MG/DL
GLUCOSE UR-MCNC: NEGATIVE
HBA1C MFR BLD HPLC: 5.4 %
HCG UR QL: 0.2 EU/DL
HCT VFR BLD CALC: 45.5 %
HCV AB SER QL: NONREACTIVE
HCV S/CO RATIO: 0.07 S/CO
HDLC SERPL-MCNC: 64 MG/DL
HGB BLD-MCNC: 14.7 G/DL
HGB UR QL STRIP.AUTO: NORMAL
IMM GRANULOCYTES NFR BLD AUTO: 0.3 %
KETONES UR-MCNC: 15
KETONES URINE: 15 MG/DL
LDLC SERPL CALC-MCNC: 103 MG/DL
LEUKOCYTE ESTERASE UR QL STRIP: NEGATIVE
LEUKOCYTE ESTERASE URINE: NEGATIVE
LYMPHOCYTES # BLD AUTO: 2.64 K/UL
LYMPHOCYTES NFR BLD AUTO: 41.2 %
MAGNESIUM SERPL-MCNC: 2.2 MG/DL
MAN DIFF?: NORMAL
MCHC RBC-ENTMCNC: 28.6 PG
MCHC RBC-ENTMCNC: 32.3 G/DL
MCV RBC AUTO: 88.5 FL
MICROALBUMIN 24H UR DL<=1MG/L-MCNC: <1.2 MG/DL
MICROALBUMIN/CREAT 24H UR-RTO: NORMAL MG/G
MICROALBUMIN/CREAT UR TEST STR-RTO: 30
MICROSCOPIC-UA: NORMAL
MONOCYTES # BLD AUTO: 0.41 K/UL
MONOCYTES NFR BLD AUTO: 6.4 %
NEUTROPHILS # BLD AUTO: 3.26 K/UL
NEUTROPHILS NFR BLD AUTO: 50.9 %
NITRITE UR QL STRIP: NEGATIVE
NITRITE URINE: NEGATIVE
NONHDLC SERPL-MCNC: 112 MG/DL
PH UR STRIP: 5.5
PH URINE: 5.5
PHOSPHATE SERPL-MCNC: 3.9 MG/DL
PLATELET # BLD AUTO: 244 K/UL
POTASSIUM SERPL-SCNC: 4 MMOL/L
PROT SERPL-MCNC: 7.2 G/DL
PROT UR STRIP-MCNC: NEGATIVE
PROTEIN URINE: NEGATIVE MG/DL
PSA SERPL-MCNC: 0.63 NG/ML
RBC # BLD: 5.14 M/UL
RBC # FLD: 13.1 %
RED BLOOD CELLS URINE: 3 /HPF
SODIUM SERPL-SCNC: 138 MMOL/L
SP GR UR STRIP: 1.02
SPECIFIC GRAVITY URINE: 1.02
T3 SERPL-MCNC: 105 NG/DL
T3RU NFR SERPL: 0.9 TBI
T4 FREE SERPL-MCNC: 1.4 NG/DL
T4 SERPL-MCNC: 6.5 UG/DL
TRIGL SERPL-MCNC: 41 MG/DL
TSH SERPL-ACNC: 3.19 UIU/ML
UROBILINOGEN URINE: 0.2 MG/DL
WBC # FLD AUTO: 6.41 K/UL
WHITE BLOOD CELLS URINE: 0 /HPF

## 2025-02-26 ENCOUNTER — RESULT CHARGE (OUTPATIENT)
Age: 45
End: 2025-02-26

## 2025-02-26 ENCOUNTER — APPOINTMENT (OUTPATIENT)
Dept: PULMONOLOGY | Facility: CLINIC | Age: 45
End: 2025-02-26
Payer: COMMERCIAL

## 2025-02-26 VITALS — SYSTOLIC BLOOD PRESSURE: 124 MMHG | OXYGEN SATURATION: 99 % | DIASTOLIC BLOOD PRESSURE: 81 MMHG | HEART RATE: 53 BPM

## 2025-02-26 DIAGNOSIS — E74.39 OTHER DISORDERS OF INTESTINAL CARBOHYDRATE ABSORPTION: ICD-10-CM

## 2025-02-26 LAB
ALBUMIN: 30
BILIRUB UR QL STRIP: NORMAL
CLARITY UR: CLEAR
COLLECTION METHOD: NORMAL
CREATININE: 200
GLUCOSE UR-MCNC: NORMAL
HCG UR QL: 0.2 EU/DL
HGB UR QL STRIP.AUTO: NORMAL
KETONES UR-MCNC: 15
LEUKOCYTE ESTERASE UR QL STRIP: NORMAL
MICROALBUMIN/CREAT UR TEST STR-RTO: 30
NITRITE UR QL STRIP: NORMAL
PH UR STRIP: 6
PROT UR STRIP-MCNC: NORMAL
SP GR UR STRIP: 1.03

## 2025-02-26 PROCEDURE — 81003 URINALYSIS AUTO W/O SCOPE: CPT | Mod: QW

## 2025-02-26 PROCEDURE — 99214 OFFICE O/P EST MOD 30 MIN: CPT | Mod: 25

## 2025-02-26 PROCEDURE — 82044 UR ALBUMIN SEMIQUANTITATIVE: CPT | Mod: QW

## 2025-03-03 ENCOUNTER — APPOINTMENT (OUTPATIENT)
Dept: UROLOGY | Facility: CLINIC | Age: 45
End: 2025-03-03
Payer: COMMERCIAL

## 2025-03-03 VITALS
BODY MASS INDEX: 22.51 KG/M2 | OXYGEN SATURATION: 99 % | WEIGHT: 152 LBS | SYSTOLIC BLOOD PRESSURE: 134 MMHG | TEMPERATURE: 93.2 F | DIASTOLIC BLOOD PRESSURE: 71 MMHG | HEART RATE: 60 BPM | HEIGHT: 69 IN

## 2025-03-03 DIAGNOSIS — R31.29 OTHER MICROSCOPIC HEMATURIA: ICD-10-CM

## 2025-03-03 LAB
APPEARANCE: CLEAR
BACTERIA UR CULT: NORMAL
BACTERIA: NEGATIVE /HPF
BILIRUBIN URINE: NEGATIVE
BLOOD URINE: ABNORMAL
CAST: 0 /LPF
COLOR: YELLOW
CREAT SPEC-SCNC: 178 MG/DL
EPITHELIAL CELLS: 0 /HPF
GLUCOSE QUALITATIVE U: NEGATIVE
KETONES URINE: ABNORMAL
LEUKOCYTE ESTERASE URINE: NEGATIVE
MICROALBUMIN 24H UR DL<=1MG/L-MCNC: <1.2 MG/DL
MICROALBUMIN/CREAT 24H UR-RTO: NORMAL MG/G
MICROSCOPIC-UA: NORMAL
NITRITE URINE: NEGATIVE
PH URINE: 6
PROTEIN URINE: NEGATIVE
RED BLOOD CELLS URINE: 3 /HPF
SPECIFIC GRAVITY URINE: 1.02
UROBILINOGEN URINE: NORMAL
WHITE BLOOD CELLS URINE: 0 /HPF

## 2025-03-03 PROCEDURE — 99204 OFFICE O/P NEW MOD 45 MIN: CPT

## 2025-03-03 PROCEDURE — G2211 COMPLEX E/M VISIT ADD ON: CPT | Mod: NC

## 2025-03-04 LAB
APPEARANCE: CLEAR
BACTERIA UR CULT: NORMAL
BACTERIA: NEGATIVE /HPF
BILIRUBIN URINE: NEGATIVE
BLOOD URINE: NEGATIVE
CAST: 0 /LPF
COLOR: YELLOW
EPITHELIAL CELLS: 0 /HPF
GLUCOSE QUALITATIVE U: NEGATIVE MG/DL
KETONES URINE: ABNORMAL MG/DL
LEUKOCYTE ESTERASE URINE: NEGATIVE
MICROSCOPIC-UA: NORMAL
NITRITE URINE: NEGATIVE
PH URINE: 6.5
PROTEIN URINE: NEGATIVE MG/DL
RED BLOOD CELLS URINE: 2 /HPF
SPECIFIC GRAVITY URINE: 1.02
UROBILINOGEN URINE: 1 MG/DL
WHITE BLOOD CELLS URINE: 0 /HPF

## 2025-03-22 ENCOUNTER — OUTPATIENT (OUTPATIENT)
Dept: OUTPATIENT SERVICES | Facility: HOSPITAL | Age: 45
LOS: 1 days | End: 2025-03-22
Payer: COMMERCIAL

## 2025-03-22 ENCOUNTER — APPOINTMENT (OUTPATIENT)
Dept: MRI IMAGING | Facility: CLINIC | Age: 45
End: 2025-03-22

## 2025-03-22 DIAGNOSIS — R31.29 OTHER MICROSCOPIC HEMATURIA: ICD-10-CM

## 2025-03-22 PROCEDURE — 72197 MRI PELVIS W/O & W/DYE: CPT | Mod: 26

## 2025-03-22 PROCEDURE — A9585: CPT

## 2025-03-22 PROCEDURE — 74183 MRI ABD W/O CNTR FLWD CNTR: CPT

## 2025-03-22 PROCEDURE — 74183 MRI ABD W/O CNTR FLWD CNTR: CPT | Mod: 26

## 2025-03-22 PROCEDURE — 72197 MRI PELVIS W/O & W/DYE: CPT

## 2025-03-31 ENCOUNTER — APPOINTMENT (OUTPATIENT)
Dept: UROLOGY | Facility: CLINIC | Age: 45
End: 2025-03-31
Payer: COMMERCIAL

## 2025-03-31 VITALS
OXYGEN SATURATION: 100 % | DIASTOLIC BLOOD PRESSURE: 82 MMHG | HEART RATE: 60 BPM | BODY MASS INDEX: 22.45 KG/M2 | WEIGHT: 152 LBS | TEMPERATURE: 97.8 F | SYSTOLIC BLOOD PRESSURE: 153 MMHG

## 2025-03-31 DIAGNOSIS — R30.0 DYSURIA: ICD-10-CM

## 2025-03-31 DIAGNOSIS — R31.29 OTHER MICROSCOPIC HEMATURIA: ICD-10-CM

## 2025-03-31 PROCEDURE — 99214 OFFICE O/P EST MOD 30 MIN: CPT | Mod: 25

## 2025-03-31 PROCEDURE — 52000 CYSTOURETHROSCOPY: CPT

## 2025-03-31 RX ORDER — PHENAZOPYRIDINE 200 MG/1
200 TABLET, FILM COATED ORAL 3 TIMES DAILY
Qty: 10 | Refills: 0 | Status: ACTIVE | COMMUNITY
Start: 2025-03-31 | End: 1900-01-01

## 2025-03-31 RX ORDER — TAMSULOSIN HYDROCHLORIDE 0.4 MG/1
0.4 CAPSULE ORAL
Qty: 15 | Refills: 0 | Status: ACTIVE | COMMUNITY
Start: 2025-03-31 | End: 1900-01-01

## 2025-08-06 ENCOUNTER — APPOINTMENT (OUTPATIENT)
Dept: PULMONOLOGY | Facility: CLINIC | Age: 45
End: 2025-08-06

## 2025-09-06 ENCOUNTER — EMERGENCY (EMERGENCY)
Facility: HOSPITAL | Age: 45
LOS: 1 days | End: 2025-09-06
Admitting: EMERGENCY MEDICINE
Payer: COMMERCIAL

## 2025-09-06 VITALS
DIASTOLIC BLOOD PRESSURE: 84 MMHG | TEMPERATURE: 98 F | WEIGHT: 163.8 LBS | OXYGEN SATURATION: 97 % | RESPIRATION RATE: 18 BRPM | HEART RATE: 67 BPM | SYSTOLIC BLOOD PRESSURE: 135 MMHG

## 2025-09-06 VITALS
RESPIRATION RATE: 18 BRPM | HEART RATE: 65 BPM | OXYGEN SATURATION: 100 % | DIASTOLIC BLOOD PRESSURE: 88 MMHG | TEMPERATURE: 99 F | SYSTOLIC BLOOD PRESSURE: 130 MMHG

## 2025-09-06 PROCEDURE — 73120 X-RAY EXAM OF HAND: CPT | Mod: 26,LT

## 2025-09-06 PROCEDURE — 73090 X-RAY EXAM OF FOREARM: CPT | Mod: 26,LT

## 2025-09-06 PROCEDURE — 73020 X-RAY EXAM OF SHOULDER: CPT | Mod: 26,59,LT

## 2025-09-06 PROCEDURE — 73070 X-RAY EXAM OF ELBOW: CPT | Mod: 26,LT

## 2025-09-06 PROCEDURE — 99284 EMERGENCY DEPT VISIT MOD MDM: CPT

## 2025-09-06 PROCEDURE — 73060 X-RAY EXAM OF HUMERUS: CPT | Mod: 26,LT

## 2025-09-06 PROCEDURE — 73030 X-RAY EXAM OF SHOULDER: CPT | Mod: 26,LT

## 2025-09-06 RX ORDER — IBUPROFEN 200 MG
600 TABLET ORAL ONCE
Refills: 0 | Status: COMPLETED | OUTPATIENT
Start: 2025-09-06 | End: 2025-09-06

## 2025-09-06 RX ADMIN — Medication 600 MILLIGRAM(S): at 16:56

## 2025-09-08 ENCOUNTER — APPOINTMENT (OUTPATIENT)
Dept: ORTHOPEDIC SURGERY | Facility: CLINIC | Age: 45
End: 2025-09-08
Payer: COMMERCIAL

## 2025-09-08 DIAGNOSIS — S52.132A DISPLACED FRACTURE OF NECK OF LEFT RADIUS, INITIAL ENCOUNTER FOR CLOSED FRACTURE: ICD-10-CM

## 2025-09-08 PROBLEM — Z78.9 OTHER SPECIFIED HEALTH STATUS: Chronic | Status: ACTIVE | Noted: 2025-09-06

## 2025-09-08 PROCEDURE — 99203 OFFICE O/P NEW LOW 30 MIN: CPT
